# Patient Record
Sex: MALE | URBAN - METROPOLITAN AREA
[De-identification: names, ages, dates, MRNs, and addresses within clinical notes are randomized per-mention and may not be internally consistent; named-entity substitution may affect disease eponyms.]

---

## 2018-05-16 ENCOUNTER — HISTORICAL (OUTPATIENT)
Dept: ADMINISTRATIVE | Facility: HOSPITAL | Age: 28
End: 2018-05-16

## 2018-05-16 LAB
ABS NEUT (OLG): 2.9
ALBUMIN SERPL-MCNC: 4.4 GM/DL (ref 3.4–5)
ALBUMIN/GLOB SERPL: 2.1 {RATIO} (ref 1.5–2.5)
ALP SERPL-CCNC: 43 UNIT/L (ref 38–126)
ALT SERPL-CCNC: 72 UNIT/L (ref 7–52)
APPEARANCE, UA: CLEAR
AST SERPL-CCNC: 30 UNIT/L (ref 15–37)
BACTERIA #/AREA URNS AUTO: NORMAL /HPF
BILIRUB SERPL-MCNC: 0.6 MG/DL (ref 0.2–1)
BILIRUB UR QL STRIP: NEGATIVE MG/DL
BILIRUBIN DIRECT+TOT PNL SERPL-MCNC: 0.1 MG/DL (ref 0–0.5)
BILIRUBIN DIRECT+TOT PNL SERPL-MCNC: 0.5 MG/DL
BUN SERPL-MCNC: 14 MG/DL (ref 7–18)
CALCIUM SERPL-MCNC: 8.8 MG/DL (ref 8.5–10)
CHLORIDE SERPL-SCNC: 103 MMOL/L (ref 98–107)
CHOLEST SERPL-MCNC: 185 MG/DL (ref 0–200)
CHOLEST/HDLC SERPL: 6.4 {RATIO}
CO2 SERPL-SCNC: 31 MMOL/L (ref 21–32)
COLOR UR: YELLOW
CREAT SERPL-MCNC: 1.04 MG/DL (ref 0.6–1.3)
ERYTHROCYTE [DISTWIDTH] IN BLOOD BY AUTOMATED COUNT: 11.8 % (ref 11.5–17)
GLOBULIN SER-MCNC: 2.1 GM/DL (ref 1.2–3)
GLUCOSE (UA): NEGATIVE MG/DL
GLUCOSE SERPL-MCNC: 102 MG/DL (ref 74–106)
HCT VFR BLD AUTO: 44.3 % (ref 42–52)
HDLC SERPL-MCNC: 29 MG/DL (ref 35–60)
HGB BLD-MCNC: 14.9 GM/DL (ref 14–18)
HGB UR QL STRIP: NEGATIVE UNIT/L
KETONES UR QL STRIP: NEGATIVE MG/DL
LDLC SERPL CALC-MCNC: 130 MG/DL (ref 0–129)
LEUKOCYTE ESTERASE UR QL STRIP: NEGATIVE UNIT/L
LYMPHOCYTES # BLD AUTO: 1.6 X10(3)/MCL (ref 0.6–3.4)
LYMPHOCYTES NFR BLD AUTO: 31.4 % (ref 13–40)
MCH RBC QN AUTO: 29.8 PG (ref 27–31.2)
MCHC RBC AUTO-ENTMCNC: 34 GM/DL (ref 32–36)
MCV RBC AUTO: 89 FL (ref 80–94)
MONOCYTES # BLD AUTO: 0.7 X10(3)/MCL (ref 0–1.8)
MONOCYTES NFR BLD AUTO: 13.7 % (ref 0.1–24)
NEUTROPHILS NFR BLD AUTO: 54.9 % (ref 47–80)
NITRITE UR QL STRIP.AUTO: NEGATIVE
PH UR STRIP: 6.5 [PH]
PLATELET # BLD AUTO: 209 X10(3)/MCL (ref 130–400)
PMV BLD AUTO: 8.1 FL
POTASSIUM SERPL-SCNC: 4.5 MMOL/L (ref 3.5–5.1)
PROT SERPL-MCNC: 6.5 GM/DL (ref 6.4–8.2)
PROT UR QL STRIP: NEGATIVE MG/DL
RBC # BLD AUTO: 5 X10(6)/MCL (ref 4.7–6.1)
RBC #/AREA URNS HPF: NORMAL /HPF
SODIUM SERPL-SCNC: 140 MMOL/L (ref 136–145)
SP GR UR STRIP: 1.02
SQUAMOUS EPITHELIAL, UA: NORMAL /LPF
TRIGL SERPL-MCNC: 104 MG/DL (ref 30–150)
UROBILINOGEN UR STRIP-ACNC: 0.2 MG/DL
VLDLC SERPL CALC-MCNC: 20.8 MG/DL
WBC # SPEC AUTO: 5.2 X10(3)/MCL (ref 4.5–11.5)
WBC #/AREA URNS AUTO: NORMAL /[HPF]

## 2018-09-27 ENCOUNTER — HISTORICAL (OUTPATIENT)
Dept: ADMINISTRATIVE | Facility: HOSPITAL | Age: 28
End: 2018-09-27

## 2018-09-27 ENCOUNTER — HISTORICAL (OUTPATIENT)
Dept: LAB | Facility: HOSPITAL | Age: 28
End: 2018-09-27

## 2018-09-27 LAB
ABS NEUT (OLG): 2.2
ALBUMIN SERPL-MCNC: 4.2 GM/DL (ref 3.4–5)
ALBUMIN/GLOB SERPL: 1.35 {RATIO} (ref 1.5–2.5)
ALP SERPL-CCNC: 63 UNIT/L (ref 38–126)
ALT SERPL-CCNC: 109 UNIT/L (ref 7–52)
APPEARANCE, UA: ABNORMAL
AST SERPL-CCNC: 65 UNIT/L (ref 15–37)
BACTERIA #/AREA URNS AUTO: ABNORMAL /HPF
BILIRUB SERPL-MCNC: 0.7 MG/DL (ref 0.2–1)
BILIRUB UR QL STRIP: ABNORMAL MG/DL
BILIRUBIN DIRECT+TOT PNL SERPL-MCNC: 0.1 MG/DL (ref 0–0.5)
BILIRUBIN DIRECT+TOT PNL SERPL-MCNC: 0.6 MG/DL
BUN SERPL-MCNC: 17 MG/DL (ref 7–18)
CALCIUM SERPL-MCNC: 8.8 MG/DL (ref 8.5–10)
CHLORIDE SERPL-SCNC: 101 MMOL/L (ref 98–107)
CO2 SERPL-SCNC: 30 MMOL/L (ref 21–32)
COLOR UR: ABNORMAL
CREAT SERPL-MCNC: 1.04 MG/DL (ref 0.6–1.3)
ERYTHROCYTE [DISTWIDTH] IN BLOOD BY AUTOMATED COUNT: 12.6 % (ref 11.5–17)
GLOBULIN SER-MCNC: 3.1 GM/DL (ref 1.2–3)
GLUCOSE (UA): NEGATIVE MG/DL
GLUCOSE SERPL-MCNC: 125 MG/DL (ref 74–106)
HCT VFR BLD AUTO: 44 % (ref 42–52)
HGB BLD-MCNC: 13.9 GM/DL (ref 14–18)
HGB UR QL STRIP: NEGATIVE UNIT/L
KETONES UR QL STRIP: ABNORMAL MG/DL
LEUKOCYTE ESTERASE UR QL STRIP: NEGATIVE UNIT/L
LYMPHOCYTES # BLD AUTO: 4.3 X10(3)/MCL (ref 0.6–3.4)
LYMPHOCYTES NFR BLD AUTO: 58.3 % (ref 13–40)
MCH RBC QN AUTO: 29 PG (ref 27–31.2)
MCHC RBC AUTO-ENTMCNC: 32 GM/DL (ref 32–36)
MCV RBC AUTO: 92 FL (ref 80–94)
MONOCYTES # BLD AUTO: 0.9 X10(3)/MCL (ref 0–1.8)
MONOCYTES NFR BLD AUTO: 11.7 % (ref 0.1–24)
NEUTROPHILS NFR BLD AUTO: 30 % (ref 47–80)
NITRITE UR QL STRIP.AUTO: NEGATIVE
PH UR STRIP: 6 [PH]
PLATELET # BLD AUTO: 218 X10(3)/MCL (ref 130–400)
PMV BLD AUTO: 7.7 FL
POTASSIUM SERPL-SCNC: 4 MMOL/L (ref 3.5–5.1)
PROT SERPL-MCNC: 7.3 GM/DL (ref 6.4–8.2)
PROT UR QL STRIP: ABNORMAL MG/DL
RBC # BLD AUTO: 4.79 X10(6)/MCL (ref 4.7–6.1)
RBC #/AREA URNS HPF: ABNORMAL /HPF
SODIUM SERPL-SCNC: 137 MMOL/L (ref 136–145)
SP GR UR STRIP: 1.02
SQUAMOUS EPITHELIAL, UA: ABNORMAL /LPF
UROBILINOGEN UR STRIP-ACNC: 1 MG/DL
WBC # SPEC AUTO: 7.4 X10(3)/MCL (ref 4.5–11.5)
WBC #/AREA URNS AUTO: ABNORMAL /[HPF]

## 2019-07-03 ENCOUNTER — HISTORICAL (OUTPATIENT)
Dept: ADMINISTRATIVE | Facility: HOSPITAL | Age: 29
End: 2019-07-03

## 2019-07-03 LAB
ABS NEUT (OLG): 2.6 X10(3)/MCL (ref 2.1–9.2)
ALBUMIN SERPL-MCNC: 4.4 GM/DL (ref 3.4–5)
ALBUMIN/GLOB SERPL: 1.76 {RATIO} (ref 1.5–2.5)
ALP SERPL-CCNC: 42 UNIT/L (ref 38–126)
ALT SERPL-CCNC: 43 UNIT/L (ref 7–52)
APPEARANCE, UA: CLEAR
AST SERPL-CCNC: 19 UNIT/L (ref 15–37)
BACTERIA #/AREA URNS AUTO: ABNORMAL /HPF
BILIRUB SERPL-MCNC: 0.6 MG/DL (ref 0.2–1)
BILIRUB UR QL STRIP: NEGATIVE MG/DL
BILIRUBIN DIRECT+TOT PNL SERPL-MCNC: 0.1 MG/DL (ref 0–0.5)
BILIRUBIN DIRECT+TOT PNL SERPL-MCNC: 0.5 MG/DL
BUN SERPL-MCNC: 17 MG/DL (ref 7–18)
CALCIUM SERPL-MCNC: 9 MG/DL (ref 8.5–10)
CHLORIDE SERPL-SCNC: 103 MMOL/L (ref 98–107)
CHOLEST SERPL-MCNC: 157 MG/DL (ref 0–200)
CHOLEST/HDLC SERPL: 5.4 {RATIO}
CO2 SERPL-SCNC: 30 MMOL/L (ref 21–32)
COLOR UR: YELLOW
CREAT SERPL-MCNC: 1.09 MG/DL (ref 0.6–1.3)
ERYTHROCYTE [DISTWIDTH] IN BLOOD BY AUTOMATED COUNT: 11.9 % (ref 11.5–17)
GLOBULIN SER-MCNC: 2.5 GM/DL (ref 1.2–3)
GLUCOSE (UA): NEGATIVE MG/DL
GLUCOSE SERPL-MCNC: 107 MG/DL (ref 74–106)
HCT VFR BLD AUTO: 47.3 % (ref 42–52)
HDLC SERPL-MCNC: 29 MG/DL (ref 35–60)
HGB BLD-MCNC: 15.7 GM/DL (ref 14–18)
HGB UR QL STRIP: NEGATIVE UNIT/L
KETONES UR QL STRIP: NEGATIVE MG/DL
LDLC SERPL CALC-MCNC: 111 MG/DL (ref 0–129)
LEUKOCYTE ESTERASE UR QL STRIP: NEGATIVE UNIT/L
LYMPHOCYTES # BLD AUTO: 2 X10(3)/MCL (ref 0.6–3.4)
LYMPHOCYTES NFR BLD AUTO: 37.5 % (ref 13–40)
MCH RBC QN AUTO: 29 PG (ref 27–31.2)
MCHC RBC AUTO-ENTMCNC: 33 GM/DL (ref 32–36)
MCV RBC AUTO: 87 FL (ref 80–94)
MONOCYTES # BLD AUTO: 0.6 X10(3)/MCL (ref 0.1–1.3)
MONOCYTES NFR BLD AUTO: 12.1 % (ref 0.1–24)
NEUTROPHILS NFR BLD AUTO: 50.4 % (ref 47–80)
NITRITE UR QL STRIP.AUTO: NEGATIVE
PH UR STRIP: 5.5 [PH]
PLATELET # BLD AUTO: 243 X10(3)/MCL (ref 130–400)
PMV BLD AUTO: 8 FL (ref 9.4–12.4)
POTASSIUM SERPL-SCNC: 4.1 MMOL/L (ref 3.5–5.1)
PROT SERPL-MCNC: 6.9 GM/DL (ref 6.4–8.2)
PROT UR QL STRIP: NEGATIVE MG/DL
RBC # BLD AUTO: 5.42 X10(6)/MCL (ref 4.7–6.1)
RBC #/AREA URNS HPF: ABNORMAL /HPF
SODIUM SERPL-SCNC: 139 MMOL/L (ref 136–145)
SP GR UR STRIP: >1.03
SQUAMOUS EPITHELIAL, UA: ABNORMAL /LPF
TRIGL SERPL-MCNC: 129 MG/DL (ref 30–150)
UROBILINOGEN UR STRIP-ACNC: 0.2 MG/DL
VLDLC SERPL CALC-MCNC: 25.8 MG/DL
WBC # SPEC AUTO: 5.2 X10(3)/MCL (ref 4.5–11.5)
WBC #/AREA URNS AUTO: ABNORMAL /[HPF]

## 2020-07-30 ENCOUNTER — HISTORICAL (OUTPATIENT)
Dept: ADMINISTRATIVE | Facility: HOSPITAL | Age: 30
End: 2020-07-30

## 2020-07-30 LAB
ABS NEUT (OLG): 2.9 X10(3)/MCL (ref 2.1–9.2)
ALBUMIN SERPL-MCNC: 4.7 GM/DL (ref 3.4–5)
ALBUMIN/GLOB SERPL: 1.42 {RATIO} (ref 1.5–2.5)
ALP SERPL-CCNC: 49 UNIT/L (ref 38–126)
ALT SERPL-CCNC: 29 UNIT/L (ref 7–52)
APPEARANCE, UA: CLEAR
AST SERPL-CCNC: 19 UNIT/L (ref 15–37)
BACTERIA #/AREA URNS AUTO: NORMAL /HPF
BILIRUB SERPL-MCNC: 0.8 MG/DL (ref 0.2–1)
BILIRUB UR QL STRIP: NEGATIVE MG/DL
BILIRUBIN DIRECT+TOT PNL SERPL-MCNC: 0.2 MG/DL (ref 0–0.5)
BILIRUBIN DIRECT+TOT PNL SERPL-MCNC: 0.6 MG/DL
BUN SERPL-MCNC: 17 MG/DL (ref 7–18)
CALCIUM SERPL-MCNC: 9.6 MG/DL (ref 8.5–10.1)
CHLORIDE SERPL-SCNC: 100 MMOL/L (ref 98–107)
CHOLEST SERPL-MCNC: 188 MG/DL (ref 0–200)
CHOLEST/HDLC SERPL: 4.7 {RATIO}
CO2 SERPL-SCNC: 32 MMOL/L (ref 21–32)
COLOR UR: YELLOW
CREAT SERPL-MCNC: 1 MG/DL (ref 0.6–1.3)
ERYTHROCYTE [DISTWIDTH] IN BLOOD BY AUTOMATED COUNT: 11.9 % (ref 11.5–17)
GLOBULIN SER-MCNC: 3.3 GM/DL (ref 1.2–3)
GLUCOSE (UA): NEGATIVE MG/DL
GLUCOSE SERPL-MCNC: 96 MG/DL (ref 74–106)
HCT VFR BLD AUTO: 45.3 % (ref 42–52)
HDLC SERPL-MCNC: 40 MG/DL (ref 35–60)
HGB BLD-MCNC: 15.2 GM/DL (ref 14–18)
HGB UR QL STRIP: NEGATIVE UNIT/L
KETONES UR QL STRIP: NEGATIVE MG/DL
LDLC SERPL CALC-MCNC: 126 MG/DL (ref 0–129)
LEUKOCYTE ESTERASE UR QL STRIP: NEGATIVE UNIT/L
LYMPHOCYTES # BLD AUTO: 2.3 X10(3)/MCL (ref 0.6–3.4)
LYMPHOCYTES NFR BLD AUTO: 38.6 % (ref 13–40)
MCH RBC QN AUTO: 29.1 PG (ref 27–31.2)
MCHC RBC AUTO-ENTMCNC: 34 GM/DL (ref 32–36)
MCV RBC AUTO: 87 FL (ref 80–94)
MONOCYTES # BLD AUTO: 0.7 X10(3)/MCL (ref 0.1–1.3)
MONOCYTES NFR BLD AUTO: 11.6 % (ref 0.1–24)
NEUTROPHILS NFR BLD AUTO: 49.8 % (ref 47–80)
NITRITE UR QL STRIP.AUTO: NEGATIVE
PH UR STRIP: 7 [PH]
PLATELET # BLD AUTO: 234 X10(3)/MCL (ref 130–400)
PMV BLD AUTO: 8 FL (ref 9.4–12.4)
POTASSIUM SERPL-SCNC: 3.9 MMOL/L (ref 3.5–5.1)
PROT SERPL-MCNC: 8 GM/DL (ref 6.4–8.2)
PROT UR QL STRIP: NEGATIVE MG/DL
RBC # BLD AUTO: 5.23 X10(6)/MCL (ref 4.7–6.1)
RBC #/AREA URNS HPF: NORMAL /HPF
SODIUM SERPL-SCNC: 138 MMOL/L (ref 136–145)
SP GR UR STRIP: 1.01
SQUAMOUS EPITHELIAL, UA: NORMAL /LPF
TRIGL SERPL-MCNC: 152 MG/DL (ref 30–150)
UROBILINOGEN UR STRIP-ACNC: 0.2 MG/DL
VLDLC SERPL CALC-MCNC: 30.4 MG/DL
WBC # SPEC AUTO: 5.9 X10(3)/MCL (ref 4.5–11.5)
WBC #/AREA URNS AUTO: NORMAL /[HPF]

## 2021-08-04 ENCOUNTER — HISTORICAL (OUTPATIENT)
Dept: ADMINISTRATIVE | Facility: HOSPITAL | Age: 31
End: 2021-08-04

## 2021-08-04 LAB
ABS NEUT (OLG): 2.9 X10(3)/MCL (ref 2.1–9.2)
ALBUMIN SERPL-MCNC: 4.6 GM/DL (ref 3.4–5)
ALBUMIN/GLOB SERPL: 2 {RATIO} (ref 1.5–2.5)
ALP SERPL-CCNC: 49 UNIT/L (ref 38–126)
ALT SERPL-CCNC: 17 UNIT/L (ref 7–52)
APPEARANCE, UA: CLEAR
AST SERPL-CCNC: 16 UNIT/L (ref 15–37)
BACTERIA #/AREA URNS AUTO: NORMAL /HPF
BILIRUB SERPL-MCNC: 0.6 MG/DL (ref 0.2–1)
BILIRUB UR QL STRIP: NEGATIVE MG/DL
BILIRUBIN DIRECT+TOT PNL SERPL-MCNC: 0.1 MG/DL (ref 0–0.5)
BILIRUBIN DIRECT+TOT PNL SERPL-MCNC: 0.5 MG/DL
BUN SERPL-MCNC: 12 MG/DL (ref 7–18)
CALCIUM SERPL-MCNC: 9.3 MG/DL (ref 8.5–10.1)
CHLORIDE SERPL-SCNC: 101 MMOL/L (ref 98–107)
CHOLEST SERPL-MCNC: 156 MG/DL (ref 0–200)
CHOLEST/HDLC SERPL: 4.7 {RATIO}
CO2 SERPL-SCNC: 33 MMOL/L (ref 21–32)
COLOR UR: YELLOW
CREAT SERPL-MCNC: 0.95 MG/DL (ref 0.6–1.3)
ERYTHROCYTE [DISTWIDTH] IN BLOOD BY AUTOMATED COUNT: 11.8 % (ref 11.5–17)
GLOBULIN SER-MCNC: 2.3 GM/DL (ref 1.2–3)
GLUCOSE (UA): NEGATIVE MG/DL
GLUCOSE SERPL-MCNC: 94 MG/DL (ref 74–106)
HCT VFR BLD AUTO: 42.7 % (ref 42–52)
HDLC SERPL-MCNC: 33 MG/DL (ref 35–60)
HGB BLD-MCNC: 14.2 GM/DL (ref 14–18)
HGB UR QL STRIP: NEGATIVE UNIT/L
KETONES UR QL STRIP: NEGATIVE MG/DL
LDLC SERPL CALC-MCNC: 97 MG/DL (ref 0–129)
LEUKOCYTE ESTERASE UR QL STRIP: NEGATIVE UNIT/L
LYMPHOCYTES # BLD AUTO: 1.8 X10(3)/MCL (ref 0.6–3.4)
LYMPHOCYTES NFR BLD AUTO: 33.4 % (ref 13–40)
MCH RBC QN AUTO: 29.3 PG (ref 27–31.2)
MCHC RBC AUTO-ENTMCNC: 33 GM/DL (ref 32–36)
MCV RBC AUTO: 88 FL (ref 80–94)
MONOCYTES # BLD AUTO: 0.6 X10(3)/MCL (ref 0.1–1.3)
MONOCYTES NFR BLD AUTO: 10.4 % (ref 0.1–24)
NEUTROPHILS NFR BLD AUTO: 56.2 % (ref 47–80)
NITRITE UR QL STRIP.AUTO: NEGATIVE
PH UR STRIP: 7.5 [PH]
PLATELET # BLD AUTO: 235 X10(3)/MCL (ref 130–400)
PMV BLD AUTO: 8.1 FL (ref 9.4–12.4)
POTASSIUM SERPL-SCNC: 4.1 MMOL/L (ref 3.5–5.1)
PROT SERPL-MCNC: 6.9 GM/DL (ref 6.4–8.2)
PROT UR QL STRIP: NEGATIVE MG/DL
RBC # BLD AUTO: 4.85 X10(6)/MCL (ref 4.7–6.1)
RBC #/AREA URNS HPF: NORMAL /HPF
SODIUM SERPL-SCNC: 140 MMOL/L (ref 136–145)
SP GR UR STRIP: 1.02
SQUAMOUS EPITHELIAL, UA: NORMAL /LPF
TRIGL SERPL-MCNC: 113 MG/DL (ref 30–150)
UROBILINOGEN UR STRIP-ACNC: 0.2 MG/DL
VLDLC SERPL CALC-MCNC: 22.6 MG/DL
WBC # SPEC AUTO: 5.3 X10(3)/MCL (ref 4.5–11.5)
WBC #/AREA URNS AUTO: NORMAL /[HPF]

## 2022-04-10 ENCOUNTER — HISTORICAL (OUTPATIENT)
Dept: ADMINISTRATIVE | Facility: HOSPITAL | Age: 32
End: 2022-04-10

## 2022-04-29 VITALS
SYSTOLIC BLOOD PRESSURE: 114 MMHG | BODY MASS INDEX: 28.44 KG/M2 | HEIGHT: 70 IN | DIASTOLIC BLOOD PRESSURE: 68 MMHG | WEIGHT: 198.63 LBS

## 2022-05-04 NOTE — HISTORICAL [facility] CERNER
This is a historical note converted from [employer]. Formatting and pictures may have been removed.  Please reference Stuart for original formatting and attached multimedia. Chief Complaint  CCC SYMPTOMS GOTTEN WORSE THE PAST 3 WEEKS. STARTED WITH FATIGUE, FEVER IN THE EVENING AND MORNINGS. HAS HAD A LINGERING COUCH ASSOC WITH A TICKLE IN THROAT. ACHINESS.  History of Present Illness  Patient is here today with complaints of general malaise and fatigue that has been ongoing for the past 3 weeks. ?He has noticed?low-grade fever?typically 100.4-100.5?max?for the past 6 days.? He has a minimal cough?that is nonproductive?denies any nasal congestion, sore throat, abdominal pain?or other physical symptoms. ?He has noticed that his urine has been?darker than normal despite trying to?increase hydration.  Review of Systems  General:? Fever 100.5 highest but 100.4 frequently, +fatigue  HEENT no sinus/ ST, no nasal congestion, no ear pain  Respiratory:?Very mild cough, nonproductive?for the past 2 weeks, no shortness of breath  Cardiac: Noncontributory  GI:?No nausea vomiting diarrhea or constipation, no abdominal pain  :?Slightly concentrated urine?despite trying to stay hydrated, no hematuria or dysuria  Neuro: No headache or stiff neck  Physical Exam  Vitals & Measurements  T:?37? ?C (Oral)? HR:?76(Peripheral)?  HT:?177.8?cm? HT:?177.8?cm? WT:?97.4?kg? WT:?97.4?kg? BMI:?30.81?  GENERAL:? No apparent distress  HEENT:? mouth clear, throat ?clear, nares ?within normal limits, tympanic membranes ?wnl bilaterally, ?no?cervical lymphadenopathy, neck supple  CHEST:?CTA bilaterally  CARDIAC: RRR no murmurs audible  ABD:?Soft nontender nondistended?normal bowel sounds x4?  EXT:?No edema  Assessment/Plan  1.?Fever  ? ?Viral syndrome, CBC did have?increased lymphocytes but normal white blood cell count.? CBC wnl, CMP pending, Monospot negative, UA negative and chest x-ray negative?will send off for?IgM mycoplasma?as well as?EBV  titers.? Encourage patient to increase hydration, Tylenol/ibuprofen for fever?and return to clinic if does not resolve in the next 7-10 days. ?Patient will?call?if new symptoms develop.  Ordered:  Comprehensive Metabolic Panel, Routine collect, 09/27/18 14:38:00 CDT, Blood, Stop date 09/27/18 14:38:00 CDT, Lab Collect, Fever, 09/27/18 14:38:00 CDT  Julissa-Barr Virus (EBV) Acute Infection Abs Profile-[facility] [id number], Routine collect, 09/27/18 14:58:00 CDT, Blood, Order for future visit, Stop date 09/27/18 14:58:00 CDT, Lab Collect, Fever  Malaise, 09/27/18 14:58:00 CDT  Lab Collection Request, 09/27/18 14:58:00 CDT, [facility] - [facility], 09/27/18 14:58:00 CDT  Mycoplasma Antibody IgM, Routine collect, 09/27/18 14:58:00 CDT, Blood, Order for future visit, Stop date 09/27/18 14:58:00 CDT, Lab Collect, Fever  Malaise, 09/27/18 14:58:00 CDT  Office/Outpatient Visit Level 3 Established [id number] PC, Fever  Malaise, [facility], 09/27/18 15:01:00 CDT  XR Chest 2 Views, Routine, 09/27/18 14:37:00 CDT, Other (please specify), None, Ambulatory, Rad Type, Fever, [facility], 09/27/18 14:37:00 CDT  ?  2.?Malaise  Ordered:  Julissa-Barr Virus (EBV) Acute Infection Abs Profile-[facility] [id number], Routine collect, 09/27/18 14:58:00 CDT, Blood, Order for future visit, Stop date 09/27/18 14:58:00 CDT, Lab Collect, Fever  Malaise, 09/27/18 14:58:00 CDT  Lab Collection Request, 09/27/18 14:58:00 CDT, [facility], 09/27/18 14:58:00 CDT  Mycoplasma Antibody IgM, Routine collect, 09/27/18 14:58:00 CDT, Blood, Order for future visit, Stop date 09/27/18 14:58:00 CDT, Lab Collect, Fever  Malaise, 09/27/18 14:58:00 CDT  Office/Outpatient Visit Level 3 Established [id number] PC, Fever  Malaise, [facility], 09/27/18 15:01:00 CDT  ?   Problem List/Past Medical History  Ongoing  Fever  Malaise  Obesity  Wellness examination  Historical  No qualifying data  Procedure/Surgical History  [place] teeth removed (2005)  Tonsillectomy and  adenoidectomy (1991)   Medications  No active medications  Allergies  sulfa drugs?(RASH)  Social History  Alcohol  Current, 1-2 times per week, 05/08/2018  Employment/School  Previous employment/school: [employer]NA[employer] @ [employer] 05/08/2018  Home/Environment  Lives with Children, Spouse., 05/08/2018  Tobacco  Never smoker, 05/08/2018  Family History  Acute myocardial infarction.: Negative: Father.  Anemia: Mother.  Hypertension.: Father.  Immunizations  Vaccine Date Status   tetanus/diphth/pertuss (Tdap) adult/adol 05/09/2017 Recorded   tetanus-diphtheria toxoids 02/14/2006 Recorded   meningococcal conjugate vaccine 02/14/2006 Recorded   poliovirus vaccine, inactivated 03/01/1994 Recorded   measles/mumps/rubella virus vaccine 03/01/1994 Recorded   diphtheria/pertussis, acel/tetanus ped 03/01/1994 Recorded   poliovirus vaccine, inactivated 06/22/1990 Recorded   diphtheria/pertussis, acel/tetanus ped 06/22/1990 Recorded   measles/mumps/rubella virus vaccine 03/16/1990 Recorded   measles/mumps/rubella virus vaccine 12/09/1989 Recorded   poliovirus vaccine, inactivated 06/05/1989 Recorded   diphtheria/pertussis, acel/tetanus ped 06/05/1989 Recorded   poliovirus vaccine, inactivated 04/26/1989 Recorded   diphtheria/pertussis, acel/tetanus ped 04/26/1989 Recorded   poliovirus vaccine, inactivated 02/06/1989 Recorded   diphtheria/pertussis, acel/tetanus ped 02/06/1989 Recorded   Health Maintenance  Health Maintenance  ???Pending?(in the next year)  ??? ??Due?  ??? ? ? ?ADL Screening due??09/27/18??and every 1??year(s)  ??? ? ? ?Alcohol Misuse Screening due??09/27/18??and every 1??year(s)  ??? ? ? ?Depression Screening due??09/27/18??and every?  ??? ? ? ?Diabetes Screening due??09/27/18??and every?  ??? ? ? ?Influenza Vaccine due??09/27/18??and every?  ??? ??Due In Future?  ??? ? ? ?Blood Pressure Screening not due until??05/11/19??and every 1??year(s)  ???Satisfied?(in the past 1 year)  ??? ??Satisfied?  ??? ? ?  ?Blood Pressure Screening on??05/11/18.??Satisfied by Feroz Hernández  ??? ? ? ?Body Mass Index Check on??09/27/18.??Satisfied by Elizabeth Jerome  ??? ? ? ?Diabetes Screening on??09/27/18.??Satisfied by Francis Wong MD  ??? ? ? ?Influenza Vaccine on??09/27/18.??Satisfied by Elizabeth Jerome  ??? ? ? ?Obesity Screening on??09/27/18.??Satisfied by Elizabeth Jerome  ?  ?